# Patient Record
Sex: FEMALE | Race: WHITE
[De-identification: names, ages, dates, MRNs, and addresses within clinical notes are randomized per-mention and may not be internally consistent; named-entity substitution may affect disease eponyms.]

---

## 2017-09-22 ENCOUNTER — HOSPITAL ENCOUNTER (OUTPATIENT)
Dept: HOSPITAL 61 - SURG | Age: 57
Discharge: HOME | End: 2017-09-22
Attending: INTERNAL MEDICINE
Payer: COMMERCIAL

## 2017-09-22 VITALS
SYSTOLIC BLOOD PRESSURE: 105 MMHG | DIASTOLIC BLOOD PRESSURE: 65 MMHG | DIASTOLIC BLOOD PRESSURE: 65 MMHG | DIASTOLIC BLOOD PRESSURE: 65 MMHG | SYSTOLIC BLOOD PRESSURE: 105 MMHG | SYSTOLIC BLOOD PRESSURE: 105 MMHG

## 2017-09-22 DIAGNOSIS — K64.0: ICD-10-CM

## 2017-09-22 DIAGNOSIS — E78.00: ICD-10-CM

## 2017-09-22 DIAGNOSIS — Z86.39: ICD-10-CM

## 2017-09-22 DIAGNOSIS — E11.9: ICD-10-CM

## 2017-09-22 DIAGNOSIS — Z72.89: ICD-10-CM

## 2017-09-22 DIAGNOSIS — Z82.49: ICD-10-CM

## 2017-09-22 DIAGNOSIS — E66.9: ICD-10-CM

## 2017-09-22 DIAGNOSIS — I10: ICD-10-CM

## 2017-09-22 DIAGNOSIS — Z83.3: ICD-10-CM

## 2017-09-22 DIAGNOSIS — D12.3: ICD-10-CM

## 2017-09-22 DIAGNOSIS — Z12.11: Primary | ICD-10-CM

## 2017-09-22 PROCEDURE — 82962 GLUCOSE BLOOD TEST: CPT

## 2017-09-22 PROCEDURE — 88305 TISSUE EXAM BY PATHOLOGIST: CPT

## 2017-09-22 PROCEDURE — 45380 COLONOSCOPY AND BIOPSY: CPT

## 2017-09-25 NOTE — PATHOLOGY
PATHOLOGY REPORT 

 

*    *    *    *    *    *    *    * 

 FINAL DIAGNOSIS:

Colonic mucosa "hepatic flexure biopsy":

- Polypoid colonic mucosa consistent with hyperplastic polyp.  

- There is no evidence of adenomatous change, high-grade dysplasia or

malignancy. 

(SHA:pit; 2017) 

 

 

REPORT ELECTRONICALLY SIGNED BY:   Huan Ocampo M.D.

DATE/TIME:   2017 11:01

*    *    *    *    *    *    *    *

 

GROSS PATHOLOGY:

Received in formalin labeled "Danielle Maradiaga, hepatic flexure

biopsy," is a segment of tan soft tissue measuring 0.4 cm in maximum

dimensions.  The specimen is submitted entirely in cassette A1.

(JPM; 17)

 

 

 INITIAL CPT CODE(S):

A; 19942

Professional services performed by [facility] [place] [address]

 

  Technical services performed by [facility] at [address] [place] [address].

  

 SPECIMEN(S) RECEIVED:

A.Hepatic flexure biopsy

 

 CLINICAL HISTORY:

Screening 

 

 PATIENT:  DANIELLE MARADIAGA

/AGE:  1960 (Age: 57)  

PATIENT #:  [id number]

ACCESSION #:  [id number]          ALT CASE #:   

SPECIMEN COLLECTION DATE:  2017

SPECIMEN RECEIVED DATE:  2017

   

[facility] - [address] [place] [address] - PHONE: 

[phone]

* * *  END OF REPORT  * * *

## 2020-03-19 ENCOUNTER — HOSPITAL ENCOUNTER (OUTPATIENT)
Dept: HOSPITAL 61 - KCIC MRI | Age: 60
Discharge: HOME | End: 2020-03-19
Attending: FAMILY MEDICINE
Payer: COMMERCIAL

## 2020-03-19 DIAGNOSIS — M25.411: ICD-10-CM

## 2020-03-19 DIAGNOSIS — M75.21: ICD-10-CM

## 2020-03-19 DIAGNOSIS — M19.011: ICD-10-CM

## 2020-03-19 DIAGNOSIS — M75.101: Primary | ICD-10-CM

## 2020-03-19 DIAGNOSIS — M25.711: ICD-10-CM

## 2020-03-19 PROCEDURE — 73221 MRI JOINT UPR EXTREM W/O DYE: CPT

## 2020-03-19 NOTE — KCIC
EXAM: MRI right shoulder

 

DATE: 3/19/2020 12:30 PM

 

COMPARISON: None

 

INDICATION: Right shoulder pain

 

TECHNIQUE: Multiplanar, multisequence MRI of the right shoulder was 

performed without contrast.

 

FINDINGS:

Small right shoulder joint effusion. Subacromial-subdeltoid bursal fluid 

from full-thickness rotator cuff tear described below.

 

AC joint degenerative changes are seen with small inferior projecting 

osteophytes. Type I acromion. No os acromiale.

 

Full-thickness tear of the anterior fibers of the supraspinatus tendon 

measuring approximately 1.7 cm in AP dimension. Short segment 

partial-thickness articular sided tear of the infraspinatus measuring 8 mm

in AP dimension is suspected. Rotator cuff muscle bulk and signal is 

normal. No significant fatty atrophy.

 

Posterior labrum is mildly diminutive, likely physiologic may be seen with

chronic tear or degeneration.

 

Mild increased signal and thickening of the intra-articular long head 

biceps tendon. Extra-articular long head biceps tendon is normal in signal

and morphology.

 

Articular cartilage is grossly preserved. No evidence for fracture or 

osteonecrosis.

 

IMPRESSION: 

1.  Full-thickness, partial width tear of the supraspinatus tendon.

2.  Partial-thickness reticular sided tear of the infraspinatus tendon 

measuring 8 mm in AP dimension.

3.  Posterior labrum is mildly diminutive, this may be physiologic for 

this patient although chronic tear/degeneration would have similar 

appearance. 

4.  Mild intra-articular long head biceps tendinosis.

 

Electronically signed by: Jude Meyers MD (3/19/2020 1:19 PM) [id number]

## 2020-09-24 ENCOUNTER — HOSPITAL ENCOUNTER (OUTPATIENT)
Dept: HOSPITAL 61 - LAB | Age: 60
Discharge: HOME | End: 2020-09-24
Attending: ORTHOPAEDIC SURGERY
Payer: COMMERCIAL

## 2020-09-24 DIAGNOSIS — Z01.812: Primary | ICD-10-CM

## 2020-09-24 DIAGNOSIS — Z20.828: ICD-10-CM

## 2020-09-24 DIAGNOSIS — M75.101: ICD-10-CM

## 2020-09-28 ENCOUNTER — HOSPITAL ENCOUNTER (OUTPATIENT)
Dept: HOSPITAL 61 - SURG | Age: 60
Discharge: HOME | End: 2020-09-28
Attending: ORTHOPAEDIC SURGERY
Payer: COMMERCIAL

## 2020-09-28 VITALS — WEIGHT: 222 LBS | BODY MASS INDEX: 37.9 KG/M2 | HEIGHT: 64 IN

## 2020-09-28 VITALS — DIASTOLIC BLOOD PRESSURE: 62 MMHG | SYSTOLIC BLOOD PRESSURE: 138 MMHG

## 2020-09-28 DIAGNOSIS — Z72.89: ICD-10-CM

## 2020-09-28 DIAGNOSIS — Z83.3: ICD-10-CM

## 2020-09-28 DIAGNOSIS — I10: ICD-10-CM

## 2020-09-28 DIAGNOSIS — M75.121: Primary | ICD-10-CM

## 2020-09-28 DIAGNOSIS — Z79.899: ICD-10-CM

## 2020-09-28 PROCEDURE — 36415 COLL VENOUS BLD VENIPUNCTURE: CPT

## 2020-09-28 PROCEDURE — 29827 SHO ARTHRS SRG RT8TR CUF RPR: CPT

## 2020-09-28 PROCEDURE — C1782 MORCELLATOR: HCPCS

## 2020-09-28 PROCEDURE — 82306 VITAMIN D 25 HYDROXY: CPT

## 2020-09-28 PROCEDURE — 82962 GLUCOSE BLOOD TEST: CPT

## 2020-09-28 RX ADMIN — INSULIN LISPRO PRN UNIT: 100 INJECTION, SOLUTION INTRAVENOUS; SUBCUTANEOUS at 07:00

## 2020-09-28 RX ADMIN — FENTANYL CITRATE PRN MCG: 50 INJECTION INTRAMUSCULAR; INTRAVENOUS at 09:57

## 2020-09-28 RX ADMIN — INSULIN LISPRO PRN UNIT: 100 INJECTION, SOLUTION INTRAVENOUS; SUBCUTANEOUS at 09:30

## 2020-09-28 RX ADMIN — FENTANYL CITRATE PRN MCG: 50 INJECTION INTRAMUSCULAR; INTRAVENOUS at 09:42

## 2020-09-28 NOTE — PDOC4
Operative Note


Operative Note


Date of procedure: 9/20/2020





Surgeon: Prem Ness





Assistant: Geremias Christopher





Preoperative diagnosis: Complete right shoulder rotator cuff tear





Postoperative diagnosis: Same





Procedure performed: Arthroscopic right shoulder rotator cuff repair





Anesthesia: General plus regional nerve block





Complications: None





Components inserted: [employer] helacoil anchor, #2 ultra braid as well





Blood loss: 5 mL





Findings: Patient had an L-shaped tear at her supraspinatus and leading edge of 

infraspinatus


Glenohumeral cartilage was well preserved


No loose bodies


Abundant fibrotic bursal tissue


Labrum intact circumferentially


Biceps unremarkable





Reason for procedure: Patient is a 60-year-old female with persistent shoulder 

pain and dysfunction.  Please see my outpatient consult notes for further 

details.  Her and I had a discussion of the risks, benefits, and alternatives 

after failure conservative therapies and review of MRI, and she elected to 

proceed with surgery.





Description of procedure: Patient was greeted the preoperative area by myself or

the correct extremity was verified and marked.  She had placement of a regional 

nerve block by the anesthesiology team and was then brought back to the operati

ng room.  She was transferred gently supine to the operating table and underwent

successful induction of a general anesthetic.  She was then sat up in a 

beachchair position maintaining a large pad under her legs, she was secured to 

the bed with all pressure points padded, C-spine maintained in neutral position.

 After this.  The right upper extremity and shoulder girdle were prepped and 

draped in her usual sterile fashion including Ioban at the periphery of the 

drapes.  We then proceeded to conduct our standard preoperative timeout.  After 

this, I palpated marked surface anatomy and elda lines for my planned portal 

sites.  I used a spinal needle to localize a posterior superior portal and 

incised skin accordance with this.  I introduce a blunt arthroscopic trocar into

the glenohumeral joint followed by the camera.  I then created an anterosuperior

portal under spinal needle localization and dilated this hole.  I then conducted

my diagnostic arthroscopy with the above-noted findings.  After this, I created 

a lateral portal and debrided the rotator cuff footprint and tendon edges from 

this vantage point as well.  I then reposition the camera into the subacromial 

space and performed a bursectomy with combination of shaver and electrocautery 

device.  After achieved appropriate visualization I then used a [employer] and 

Spectrum to shuttle a suture limbs through in a simple configuration for a 

margin convergence of the infraspinatus.  It should be noted, I had created an 

accessory anterolateral portal for suture management.  I then placed a single 

anchor into the remaining defect, was about 1 cm in length.  After this, used 

the first pass device to shuttle 2 suture limbs through a simple configuration 

and tied these down using arthroscopic knot tying techniques.  I then removed 

all loose debris through repeated aspiration and my shaver.  After this, the 

arthroscopic instruments were withdrawn and the excess fluid was removed.  The 

arm and shoulder were cleansed and dried after the portals were repaired with 

simple interrupted 3-0 nylon.  After this, a sterile bulky dressing was applied 

and her arm was placed to an abduction pillow sling.  No complications.  At the 

conclusion of surgery, she was laid supine and transferred on spine to the 

recovery cart and taken to PACU in a stable and extubated condition.  

Postoperative plan is to discharge her home.  We will get her started on 

physical therapy.  I will see her back in my outpatient orthopedic surgery 

clinic in 2 weeks, sooner should a problem arise.  She will be discharged 

nonweightbearing, right upper extremity.











PREM NESS II, MD        Sep 28, 2020 09:10

## 2020-09-28 NOTE — DISCH
DISCHARGE INSTRUCTIONS


Condition on Discharge


Condition on Discharge:  Stable





Activity After Discharge


Activity Instructions for Disc:  Other, see below


Other activity instructions:  arm to remain in sling


Bathing Instructions:  Shower-keep dressing dry


Weight Bearing Status after Di:  Non weight bearing





Diet after Discharge


Diet after Discharge:  Regular





Wound Incision Care


Wound/Incision Care:  Ice to area for comfort, Keep wound/cast CDI, Keep wound 

elevated, Change dressing


Other wound/incision instructi:  ok to change dressing after 2 days





Contacting the DR. after DC


Call your doctor for:  Concerns you may have





Follow-Up


Follow up with:  Anamaria in 2 wks











KEN NESS II, MD        Sep 28, 2020 07:36